# Patient Record
Sex: MALE | Race: WHITE | NOT HISPANIC OR LATINO | Employment: OTHER | URBAN - METROPOLITAN AREA
[De-identification: names, ages, dates, MRNs, and addresses within clinical notes are randomized per-mention and may not be internally consistent; named-entity substitution may affect disease eponyms.]

---

## 2018-08-11 ENCOUNTER — APPOINTMENT (OUTPATIENT)
Dept: RADIOLOGY | Facility: MEDICAL CENTER | Age: 68
End: 2018-08-11
Attending: EMERGENCY MEDICINE
Payer: COMMERCIAL

## 2018-08-11 ENCOUNTER — HOSPITAL ENCOUNTER (OUTPATIENT)
Facility: MEDICAL CENTER | Age: 68
End: 2018-08-13
Attending: EMERGENCY MEDICINE | Admitting: INTERNAL MEDICINE
Payer: COMMERCIAL

## 2018-08-11 DIAGNOSIS — R55 SYNCOPE, UNSPECIFIED SYNCOPE TYPE: ICD-10-CM

## 2018-08-11 DIAGNOSIS — R07.9 CHEST PAIN, UNSPECIFIED TYPE: ICD-10-CM

## 2018-08-11 PROBLEM — I48.0 PAROXYSMAL A-FIB (HCC): Status: ACTIVE | Noted: 2018-08-11

## 2018-08-11 PROBLEM — N17.9 ACUTE KIDNEY INJURY (HCC): Status: ACTIVE | Noted: 2018-08-11

## 2018-08-11 LAB
ALBUMIN SERPL BCP-MCNC: 3.8 G/DL (ref 3.2–4.9)
ALBUMIN/GLOB SERPL: 1.4 G/DL
ALP SERPL-CCNC: 89 U/L (ref 30–99)
ALT SERPL-CCNC: 11 U/L (ref 2–50)
ANION GAP SERPL CALC-SCNC: 12 MMOL/L (ref 0–11.9)
APTT PPP: 32 SEC (ref 24.7–36)
AST SERPL-CCNC: 12 U/L (ref 12–45)
BASOPHILS # BLD AUTO: 1.1 % (ref 0–1.8)
BASOPHILS # BLD: 0.08 K/UL (ref 0–0.12)
BILIRUB SERPL-MCNC: 0.4 MG/DL (ref 0.1–1.5)
BNP SERPL-MCNC: 65 PG/ML (ref 0–100)
BUN SERPL-MCNC: 24 MG/DL (ref 8–22)
CALCIUM SERPL-MCNC: 8.9 MG/DL (ref 8.5–10.5)
CHLORIDE SERPL-SCNC: 106 MMOL/L (ref 96–112)
CO2 SERPL-SCNC: 18 MMOL/L (ref 20–33)
CREAT SERPL-MCNC: 1.39 MG/DL (ref 0.5–1.4)
DEPRECATED D DIMER PPP IA-ACNC: 408 NG/ML(D-DU)
EKG IMPRESSION: NORMAL
EKG IMPRESSION: NORMAL
EOSINOPHIL # BLD AUTO: 0.17 K/UL (ref 0–0.51)
EOSINOPHIL NFR BLD: 2.4 % (ref 0–6.9)
ERYTHROCYTE [DISTWIDTH] IN BLOOD BY AUTOMATED COUNT: 44.1 FL (ref 35.9–50)
ETHANOL BLD-MCNC: 0.08 G/DL
GLOBULIN SER CALC-MCNC: 2.7 G/DL (ref 1.9–3.5)
GLUCOSE BLD-MCNC: 127 MG/DL (ref 65–99)
GLUCOSE SERPL-MCNC: 170 MG/DL (ref 65–99)
HCT VFR BLD AUTO: 36.7 % (ref 42–52)
HGB BLD-MCNC: 12.4 G/DL (ref 14–18)
IMM GRANULOCYTES # BLD AUTO: 0.06 K/UL (ref 0–0.11)
IMM GRANULOCYTES NFR BLD AUTO: 0.8 % (ref 0–0.9)
INR PPP: 1.24 (ref 0.87–1.13)
LYMPHOCYTES # BLD AUTO: 1.52 K/UL (ref 1–4.8)
LYMPHOCYTES NFR BLD: 21.1 % (ref 22–41)
MCH RBC QN AUTO: 28 PG (ref 27–33)
MCHC RBC AUTO-ENTMCNC: 33.8 G/DL (ref 33.7–35.3)
MCV RBC AUTO: 82.8 FL (ref 81.4–97.8)
MONOCYTES # BLD AUTO: 0.66 K/UL (ref 0–0.85)
MONOCYTES NFR BLD AUTO: 9.2 % (ref 0–13.4)
NEUTROPHILS # BLD AUTO: 4.71 K/UL (ref 1.82–7.42)
NEUTROPHILS NFR BLD: 65.4 % (ref 44–72)
NRBC # BLD AUTO: 0 K/UL
NRBC BLD-RTO: 0 /100 WBC
PLATELET # BLD AUTO: 208 K/UL (ref 164–446)
PMV BLD AUTO: 9.8 FL (ref 9–12.9)
POTASSIUM SERPL-SCNC: 4 MMOL/L (ref 3.6–5.5)
PROT SERPL-MCNC: 6.5 G/DL (ref 6–8.2)
PROTHROMBIN TIME: 15.3 SEC (ref 12–14.6)
RBC # BLD AUTO: 4.43 M/UL (ref 4.7–6.1)
SODIUM SERPL-SCNC: 136 MMOL/L (ref 135–145)
TROPONIN I SERPL-MCNC: <0.01 NG/ML (ref 0–0.04)
WBC # BLD AUTO: 7.2 K/UL (ref 4.8–10.8)

## 2018-08-11 PROCEDURE — 84484 ASSAY OF TROPONIN QUANT: CPT | Mod: 91

## 2018-08-11 PROCEDURE — 700105 HCHG RX REV CODE 258: Performed by: INTERNAL MEDICINE

## 2018-08-11 PROCEDURE — 85379 FIBRIN DEGRADATION QUANT: CPT

## 2018-08-11 PROCEDURE — 304561 HCHG STAT O2

## 2018-08-11 PROCEDURE — 93005 ELECTROCARDIOGRAM TRACING: CPT | Performed by: EMERGENCY MEDICINE

## 2018-08-11 PROCEDURE — 36415 COLL VENOUS BLD VENIPUNCTURE: CPT

## 2018-08-11 PROCEDURE — G0378 HOSPITAL OBSERVATION PER HR: HCPCS

## 2018-08-11 PROCEDURE — 85610 PROTHROMBIN TIME: CPT

## 2018-08-11 PROCEDURE — 70450 CT HEAD/BRAIN W/O DYE: CPT

## 2018-08-11 PROCEDURE — 93010 ELECTROCARDIOGRAM REPORT: CPT | Performed by: INTERNAL MEDICINE

## 2018-08-11 PROCEDURE — 93005 ELECTROCARDIOGRAM TRACING: CPT | Performed by: INTERNAL MEDICINE

## 2018-08-11 PROCEDURE — 85730 THROMBOPLASTIN TIME PARTIAL: CPT

## 2018-08-11 PROCEDURE — A9270 NON-COVERED ITEM OR SERVICE: HCPCS | Performed by: INTERNAL MEDICINE

## 2018-08-11 PROCEDURE — 99220 PR INITIAL OBSERVATION CARE,LEVL III: CPT | Performed by: INTERNAL MEDICINE

## 2018-08-11 PROCEDURE — 83880 ASSAY OF NATRIURETIC PEPTIDE: CPT

## 2018-08-11 PROCEDURE — 93005 ELECTROCARDIOGRAM TRACING: CPT

## 2018-08-11 PROCEDURE — 85025 COMPLETE CBC W/AUTO DIFF WBC: CPT

## 2018-08-11 PROCEDURE — 700105 HCHG RX REV CODE 258: Performed by: EMERGENCY MEDICINE

## 2018-08-11 PROCEDURE — 80053 COMPREHEN METABOLIC PANEL: CPT

## 2018-08-11 PROCEDURE — 80307 DRUG TEST PRSMV CHEM ANLYZR: CPT

## 2018-08-11 PROCEDURE — 82962 GLUCOSE BLOOD TEST: CPT

## 2018-08-11 PROCEDURE — 96372 THER/PROPH/DIAG INJ SC/IM: CPT

## 2018-08-11 PROCEDURE — 99285 EMERGENCY DEPT VISIT HI MDM: CPT

## 2018-08-11 PROCEDURE — 700102 HCHG RX REV CODE 250 W/ 637 OVERRIDE(OP): Performed by: INTERNAL MEDICINE

## 2018-08-11 PROCEDURE — 71045 X-RAY EXAM CHEST 1 VIEW: CPT

## 2018-08-11 RX ORDER — SODIUM CHLORIDE 9 MG/ML
1000 INJECTION, SOLUTION INTRAVENOUS ONCE
Status: COMPLETED | OUTPATIENT
Start: 2018-08-11 | End: 2018-08-11

## 2018-08-11 RX ORDER — SODIUM CHLORIDE 9 MG/ML
INJECTION, SOLUTION INTRAVENOUS CONTINUOUS
Status: DISCONTINUED | OUTPATIENT
Start: 2018-08-11 | End: 2018-08-13

## 2018-08-11 RX ORDER — LABETALOL HYDROCHLORIDE 5 MG/ML
10 INJECTION, SOLUTION INTRAVENOUS
Status: DISCONTINUED | OUTPATIENT
Start: 2018-08-11 | End: 2018-08-11

## 2018-08-11 RX ORDER — HYDRALAZINE HYDROCHLORIDE 25 MG/1
25 TABLET, FILM COATED ORAL 3 TIMES DAILY
Status: DISCONTINUED | OUTPATIENT
Start: 2018-08-11 | End: 2018-08-13

## 2018-08-11 RX ORDER — CLONAZEPAM 0.5 MG/1
0.25 TABLET ORAL
COMMUNITY

## 2018-08-11 RX ORDER — AMOXICILLIN 250 MG
2 CAPSULE ORAL 2 TIMES DAILY
Status: DISCONTINUED | OUTPATIENT
Start: 2018-08-11 | End: 2018-08-13 | Stop reason: HOSPADM

## 2018-08-11 RX ORDER — INSULIN GLARGINE 100 [IU]/ML
24 INJECTION, SOLUTION SUBCUTANEOUS NIGHTLY
Status: DISCONTINUED | OUTPATIENT
Start: 2018-08-11 | End: 2018-08-13 | Stop reason: HOSPADM

## 2018-08-11 RX ORDER — ATORVASTATIN CALCIUM 10 MG/1
10 TABLET, FILM COATED ORAL
Status: DISCONTINUED | OUTPATIENT
Start: 2018-08-11 | End: 2018-08-13 | Stop reason: HOSPADM

## 2018-08-11 RX ORDER — BISACODYL 10 MG
10 SUPPOSITORY, RECTAL RECTAL
Status: DISCONTINUED | OUTPATIENT
Start: 2018-08-11 | End: 2018-08-13 | Stop reason: HOSPADM

## 2018-08-11 RX ORDER — POLYETHYLENE GLYCOL 3350 17 G/17G
1 POWDER, FOR SOLUTION ORAL
Status: DISCONTINUED | OUTPATIENT
Start: 2018-08-11 | End: 2018-08-13 | Stop reason: HOSPADM

## 2018-08-11 RX ORDER — DEXTROSE MONOHYDRATE 25 G/50ML
25 INJECTION, SOLUTION INTRAVENOUS
Status: DISCONTINUED | OUTPATIENT
Start: 2018-08-11 | End: 2018-08-13 | Stop reason: HOSPADM

## 2018-08-11 RX ORDER — ONDANSETRON 4 MG/1
4 TABLET, ORALLY DISINTEGRATING ORAL EVERY 4 HOURS PRN
Status: DISCONTINUED | OUTPATIENT
Start: 2018-08-11 | End: 2018-08-13 | Stop reason: HOSPADM

## 2018-08-11 RX ORDER — HYDRALAZINE HYDROCHLORIDE 25 MG/1
25 TABLET, FILM COATED ORAL 3 TIMES DAILY
Status: ON HOLD | COMMUNITY
End: 2018-08-13

## 2018-08-11 RX ORDER — ASPIRIN 300 MG/1
300 SUPPOSITORY RECTAL DAILY
Status: DISCONTINUED | OUTPATIENT
Start: 2018-08-11 | End: 2018-08-11

## 2018-08-11 RX ORDER — ONDANSETRON 2 MG/ML
4 INJECTION INTRAMUSCULAR; INTRAVENOUS EVERY 4 HOURS PRN
Status: DISCONTINUED | OUTPATIENT
Start: 2018-08-11 | End: 2018-08-13 | Stop reason: HOSPADM

## 2018-08-11 RX ORDER — CLONAZEPAM 0.5 MG/1
0.25 TABLET ORAL
Status: DISCONTINUED | OUTPATIENT
Start: 2018-08-11 | End: 2018-08-13 | Stop reason: HOSPADM

## 2018-08-11 RX ORDER — HEPARIN SODIUM 5000 [USP'U]/ML
5000 INJECTION, SOLUTION INTRAVENOUS; SUBCUTANEOUS EVERY 8 HOURS
Status: DISCONTINUED | OUTPATIENT
Start: 2018-08-11 | End: 2018-08-11

## 2018-08-11 RX ORDER — ASPIRIN 325 MG
325 TABLET ORAL DAILY
Status: DISCONTINUED | OUTPATIENT
Start: 2018-08-11 | End: 2018-08-11

## 2018-08-11 RX ORDER — FUROSEMIDE 40 MG/1
40 TABLET ORAL DAILY
Status: ON HOLD | COMMUNITY
End: 2018-08-13

## 2018-08-11 RX ORDER — CARVEDILOL 3.12 MG/1
3.12 TABLET ORAL 2 TIMES DAILY WITH MEALS
Status: DISCONTINUED | OUTPATIENT
Start: 2018-08-11 | End: 2018-08-12

## 2018-08-11 RX ORDER — INSULIN GLARGINE 100 [IU]/ML
24 INJECTION, SOLUTION SUBCUTANEOUS NIGHTLY
COMMUNITY

## 2018-08-11 RX ORDER — ASPIRIN 81 MG/1
81 TABLET, CHEWABLE ORAL DAILY
Status: DISCONTINUED | OUTPATIENT
Start: 2018-08-12 | End: 2018-08-13 | Stop reason: HOSPADM

## 2018-08-11 RX ORDER — ACETAMINOPHEN 325 MG/1
650 TABLET ORAL EVERY 6 HOURS PRN
Status: DISCONTINUED | OUTPATIENT
Start: 2018-08-11 | End: 2018-08-13 | Stop reason: HOSPADM

## 2018-08-11 RX ORDER — ASPIRIN 81 MG/1
324 TABLET, CHEWABLE ORAL DAILY
Status: DISCONTINUED | OUTPATIENT
Start: 2018-08-11 | End: 2018-08-11

## 2018-08-11 RX ORDER — ASPIRIN 325 MG
325 TABLET ORAL ONCE
Status: COMPLETED | OUTPATIENT
Start: 2018-08-11 | End: 2018-08-11

## 2018-08-11 RX ADMIN — APIXABAN 5 MG: 5 TABLET, FILM COATED ORAL at 20:42

## 2018-08-11 RX ADMIN — INSULIN GLARGINE 24 UNITS: 100 INJECTION, SOLUTION SUBCUTANEOUS at 22:12

## 2018-08-11 RX ADMIN — SODIUM CHLORIDE: 9 INJECTION, SOLUTION INTRAVENOUS at 22:11

## 2018-08-11 RX ADMIN — ASPIRIN 325 MG: 325 TABLET, COATED ORAL at 22:11

## 2018-08-11 RX ADMIN — SODIUM CHLORIDE 1000 ML: 9 INJECTION, SOLUTION INTRAVENOUS at 13:43

## 2018-08-11 RX ADMIN — CARVEDILOL 3.12 MG: 3.12 TABLET, FILM COATED ORAL at 20:42

## 2018-08-11 RX ADMIN — CLONAZEPAM 0.25 MG: 0.5 TABLET ORAL at 20:42

## 2018-08-11 RX ADMIN — HYDRALAZINE HYDROCHLORIDE 25 MG: 25 TABLET, FILM COATED ORAL at 20:42

## 2018-08-11 RX ADMIN — ATORVASTATIN CALCIUM 10 MG: 10 TABLET, FILM COATED ORAL at 20:42

## 2018-08-11 ASSESSMENT — COGNITIVE AND FUNCTIONAL STATUS - GENERAL
SUGGESTED CMS G CODE MODIFIER MOBILITY: CI
DAILY ACTIVITIY SCORE: 24
CLIMB 3 TO 5 STEPS WITH RAILING: A LITTLE
SUGGESTED CMS G CODE MODIFIER DAILY ACTIVITY: CH
MOBILITY SCORE: 23

## 2018-08-11 ASSESSMENT — ENCOUNTER SYMPTOMS
HEADACHES: 0
WEIGHT LOSS: 0
COUGH: 0
LOSS OF CONSCIOUSNESS: 1
INSOMNIA: 0
FOCAL WEAKNESS: 0
DIZZINESS: 0
BLURRED VISION: 0
SPUTUM PRODUCTION: 0
EYE PAIN: 0
DIARRHEA: 0
MYALGIAS: 0
NERVOUS/ANXIOUS: 0
STRIDOR: 0
FEVER: 0
NAUSEA: 0
BACK PAIN: 0
EYE DISCHARGE: 0
CHILLS: 0
ORTHOPNEA: 0
EYE REDNESS: 0
PALPITATIONS: 0
NECK PAIN: 0
DEPRESSION: 0
VOMITING: 0
HEARTBURN: 0
SEIZURES: 0
ABDOMINAL PAIN: 0
SHORTNESS OF BREATH: 0

## 2018-08-11 ASSESSMENT — PAIN SCALES - GENERAL
PAINLEVEL_OUTOF10: 2
PAINLEVEL_OUTOF10: 0

## 2018-08-11 ASSESSMENT — PATIENT HEALTH QUESTIONNAIRE - PHQ9
1. LITTLE INTEREST OR PLEASURE IN DOING THINGS: NOT AT ALL
2. FEELING DOWN, DEPRESSED, IRRITABLE, OR HOPELESS: NOT AT ALL
SUM OF ALL RESPONSES TO PHQ9 QUESTIONS 1 AND 2: 0

## 2018-08-11 ASSESSMENT — LIFESTYLE VARIABLES
ALCOHOL_USE: NO
EVER_SMOKED: NEVER

## 2018-08-11 NOTE — ED PROVIDER NOTES
ED Provider Note    CHIEF COMPLAINT  Chief Complaint   Patient presents with   • Syncope     Pt had witnessed syncopal event while at [facility].  Pt had 2 gins this morning.  Pt has history CABG in Oct 2013 and recent MI in June.  No trauma noted.         HPI  Christo Degroot is a 68 y.o. male with a history of coronary artery disease, status post CABG ×1 in 2000, CABG ×3 in 2013, status post stent placement in June 2018, status post pacemaker placement in 2004, with Medtronic pacemaker replacement in February 2018, diabetes mellitus, congestive heart failure, hypertension, CVA, noncompliance with medications who presents after having a witnessed syncopal episode.  Patient was at a local [facility], when he apparently passed out.  Patient is visiting from [place], said he drove himself out and arrived a week ago on Saturday. He says he developed chest pain radiating to his left shoulder on Sunday and was taken to the [facility] on Monday.  He was found to have uncontrolled high blood pressure, along with dizziness and complaints of chest pain.  The patient was told he would need to be transferred to [facility] to see cardiologist, and refused transfer, and signed out AGAINST MEDICAL ADVICE.  The patient has multiple complaints.  He says been having a headache, lightheadedness, dizziness.  He has been feeling nauseous but has had no vomiting.  He has been having chest pain almost on a daily basis radiating to his left neck and shoulder.  He has been feeling short of breath since arriving to [place].  He has had a cough which has been productive of whitish sputum.  He denies any fever, shaking chills, sore throat, abdominal pain, or diarrhea.  The patient has been noncompliant with his medications.  He is supposed to be on Eliquis but has not taken it for some time.    REVIEW OF SYSTEMS  See HPI for further details. All other systems are negative.     PAST MEDICAL HISTORY  Past Medical History:   Diagnosis Date   •  "CAD (coronary artery disease)    • Chronic obstructive pulmonary disease (HCC)    • Congestive heart failure (HCC)    • Diabetes (HCC)    • History of heart artery stent    • Hx of CABG    • Hypertension    • Pacemaker    • Stroke (HCC)        FAMILY HISTORY  History reviewed. No pertinent family history.    SOCIAL HISTORY  Social History     Social History   • Marital status: N/A     Spouse name: N/A   • Number of children: N/A   • Years of education: N/A     Social History Main Topics   • Smoking status: Never Smoker   • Smokeless tobacco: Never Used   • Alcohol use No      Comment: 1-2 drinks per month   • Drug use: No   • Sexual activity: Not on file     Other Topics Concern   • Not on file     Social History Narrative   • No narrative on file       SURGICAL HISTORY  History reviewed. No pertinent surgical history.    CURRENT MEDICATIONS  Home Medications    **Home medications have not yet been reviewed for this encounter**         ALLERGIES  Allergies   Allergen Reactions   • Lisinopril        EKG: Twelve-lead EKG shows atrial fibrillation, with ventricular paced complexes, rate 72, normal intervals, normal axis, no acute ST wave elevations, mild ST depressions in the anterior leads, inverted T waves in leads V2 through V6, no pathologic Q waves, no evidence of an acute injury pattern, the ST depressions and inverted T waves indicate ischemia, there is no previous EKG for comparison.    PHYSICAL EXAM  VITAL SIGNS: /66   Pulse 73   Temp 36.5 °C (97.7 °F)   Resp 16   Ht 1.854 m (6' 1\")   Wt 98.9 kg (218 lb)   BMI 28.76 kg/m²   Constitutional: Awake, somewhat sedated appearing, in no acute distress, Non-toxic appearance.   HENT: Atraumatic. Bilateral external ears normal, mucous membranes mildly dry, throat nonerythematous without exudates, nose is normal.  Eyes: PERRL, EOMI, conjunctiva moist, noninjected.  Neck: Nontender, Normal range of motion, No nuchal rigidity, No stridor.   Lymphatic: No " lymphadenopathy noted.   Cardiovascular: Regular rate and rhythm, no murmurs, rubs, gallops.  Thorax & Lungs:  Good breath sounds bilaterally, no wheezes, rales, or retractions.  No chest tenderness.  Abdomen: Bowel sounds normal, Soft, nontender, nondistended, no rebound, guarding, masses.  Back: No CVA or spinal tenderness.  Extremities: Intact distal pulses, No edema, No tenderness.   Skin: Warm, Dry, No rashes.   Musculoskeletal: No joint swelling or tenderness.  Neurologic: Awake, sedated appearing, oriented ×3, speech is mildly slurred, cranial nerves II through XII shows a mild right facial droop, mild dysarthria, sensory intact to light touch, and motor function shows 5/5 strength to the upper and lower extremities, no focal deficits.   Psychiatric: Affect normal, Judgment normal, Mood normal.     EKG  Twelve-lead EKG shows what appears to be atrial fibrillation with occasional pacer spikes, rate of 72, normal intervals, normal axis, no acute ST wave elevations, ST depressions in leads V2 through V5, no pathologic Q waves, inverted T waves in leads II, 3, aVF, V2 through V5, no evidence of an acute injury pattern, the ST depressions may indicate ischemia, there is no previous EKG for comparison.        RADIOLOGY/PROCEDURES  CT-HEAD W/O   Final Result         NO ACUTE ABNORMALITIES ARE NOTED ON CT SCAN OF THE HEAD.      Findings are consistent with atrophy.  Decreased attenuation in the periventricular white matter likely indicates microvascular ischemic disease.      DX-CHEST-LIMITED (1 VIEW)   Final Result         No acute cardiac or pulmonary abnormality is identified. Cardiomegaly is noted.      Echocardiogram Comp W/O Cont    (Results Pending)   Carotid Duplex ([facility] Only)    (Results Pending)   NM-CARDIAC STRESS TEST    (Results Pending)         COURSE & MEDICAL DECISION MAKING  Pertinent Labs & Imaging studies reviewed. (See chart for details)  The patient presents after having a  syncopal episode.  He complains of having chest pain which appears to be chronic. He had a syncopal episode, remains lightheaded and dizzy with borderline hypotension.  He was started on IV fluids. EKG shows atrial fibrillation with paced complexes.  Chest x-ray shows no acute cardiopulmonary abnormalities.  CT scan of the head without contrast shows no acute intracranial findings.  CBC shows white count 7200, hemoglobin 12.4, 65% polys, chemistry shows a CO2 of 18, anion gap 12, glucose 170, BUN 24, creatinine 1.39, liver function tests normal, troponin less than 0.01, BNP 65, INR 1.24, diagnosed alcohol 0.08.  On recheck he denies any dizziness, continues with chest pain.  He did become hypotensive which improved with IV fluids.  Findings were discussed with the patient.  Given his symptoms he will will be admitted.  I did obtain old records from the [facility].  The patient is noncompliant with medications.  He does have a history of atrial fibrillation and should be on eliquis, but has not taken it for several months.  He has a Medtronic pacemaker which was replaced in February of this year, and was last checked in April per the patient.  He is also noncompliant with his blood pressure medications.  Given his symptoms he will be admitted.  Case was discussed with Dr. Valdivia.  At his request, Dr. Muhammad of cardiology was consulted.    FINAL IMPRESSION  1.  Syncopal episode  2.  Chest pain  3.         Electronically signed by: Nuno Baltazar, 8/11/2018 1:28 PM

## 2018-08-12 ENCOUNTER — APPOINTMENT (OUTPATIENT)
Dept: RADIOLOGY | Facility: MEDICAL CENTER | Age: 68
End: 2018-08-12
Attending: NURSE PRACTITIONER
Payer: COMMERCIAL

## 2018-08-12 PROBLEM — R55 SYNCOPE: Status: ACTIVE | Noted: 2018-08-12

## 2018-08-12 PROBLEM — I95.1 SYNCOPE DUE TO ORTHOSTATIC HYPOTENSION: Status: ACTIVE | Noted: 2018-08-12

## 2018-08-12 LAB
ALBUMIN SERPL BCP-MCNC: 3.7 G/DL (ref 3.2–4.9)
ALBUMIN/GLOB SERPL: 1.4 G/DL
ALP SERPL-CCNC: 89 U/L (ref 30–99)
ALT SERPL-CCNC: 8 U/L (ref 2–50)
ANION GAP SERPL CALC-SCNC: 6 MMOL/L (ref 0–11.9)
AST SERPL-CCNC: 11 U/L (ref 12–45)
BILIRUB SERPL-MCNC: 0.5 MG/DL (ref 0.1–1.5)
BUN SERPL-MCNC: 19 MG/DL (ref 8–22)
CALCIUM SERPL-MCNC: 8.8 MG/DL (ref 8.5–10.5)
CHLORIDE SERPL-SCNC: 108 MMOL/L (ref 96–112)
CO2 SERPL-SCNC: 22 MMOL/L (ref 20–33)
CREAT SERPL-MCNC: 1.25 MG/DL (ref 0.5–1.4)
EKG IMPRESSION: NORMAL
ERYTHROCYTE [DISTWIDTH] IN BLOOD BY AUTOMATED COUNT: 41.7 FL (ref 35.9–50)
GLOBULIN SER CALC-MCNC: 2.6 G/DL (ref 1.9–3.5)
GLUCOSE BLD-MCNC: 119 MG/DL (ref 65–99)
GLUCOSE BLD-MCNC: 123 MG/DL (ref 65–99)
GLUCOSE BLD-MCNC: 181 MG/DL (ref 65–99)
GLUCOSE BLD-MCNC: 186 MG/DL (ref 65–99)
GLUCOSE SERPL-MCNC: 136 MG/DL (ref 65–99)
HCT VFR BLD AUTO: 36 % (ref 42–52)
HGB BLD-MCNC: 12.3 G/DL (ref 14–18)
LV EJECT FRACT  99904: 55
LV EJECT FRACT MOD 2C 99903: 46.75
LV EJECT FRACT MOD 4C 99902: 34.7
LV EJECT FRACT MOD BP 99901: 38.6
MCH RBC QN AUTO: 28 PG (ref 27–33)
MCHC RBC AUTO-ENTMCNC: 34.2 G/DL (ref 33.7–35.3)
MCV RBC AUTO: 81.8 FL (ref 81.4–97.8)
PLATELET # BLD AUTO: 183 K/UL (ref 164–446)
PMV BLD AUTO: 9.7 FL (ref 9–12.9)
POTASSIUM SERPL-SCNC: 4.3 MMOL/L (ref 3.6–5.5)
PROT SERPL-MCNC: 6.3 G/DL (ref 6–8.2)
RBC # BLD AUTO: 4.4 M/UL (ref 4.7–6.1)
SODIUM SERPL-SCNC: 136 MMOL/L (ref 135–145)
WBC # BLD AUTO: 7.8 K/UL (ref 4.8–10.8)

## 2018-08-12 PROCEDURE — A9270 NON-COVERED ITEM OR SERVICE: HCPCS | Performed by: INTERNAL MEDICINE

## 2018-08-12 PROCEDURE — 36415 COLL VENOUS BLD VENIPUNCTURE: CPT

## 2018-08-12 PROCEDURE — 96374 THER/PROPH/DIAG INJ IV PUSH: CPT | Mod: XU

## 2018-08-12 PROCEDURE — 93306 TTE W/DOPPLER COMPLETE: CPT | Mod: 26 | Performed by: INTERNAL MEDICINE

## 2018-08-12 PROCEDURE — 700105 HCHG RX REV CODE 258: Performed by: INTERNAL MEDICINE

## 2018-08-12 PROCEDURE — A9502 TC99M TETROFOSMIN: HCPCS

## 2018-08-12 PROCEDURE — 93880 EXTRACRANIAL BILAT STUDY: CPT

## 2018-08-12 PROCEDURE — 85027 COMPLETE CBC AUTOMATED: CPT

## 2018-08-12 PROCEDURE — 99226 PR SUBSEQUENT OBSERVATION CARE,LEVEL III: CPT | Performed by: INTERNAL MEDICINE

## 2018-08-12 PROCEDURE — 93306 TTE W/DOPPLER COMPLETE: CPT

## 2018-08-12 PROCEDURE — 82962 GLUCOSE BLOOD TEST: CPT | Mod: 91

## 2018-08-12 PROCEDURE — 700111 HCHG RX REV CODE 636 W/ 250 OVERRIDE (IP)

## 2018-08-12 PROCEDURE — 700111 HCHG RX REV CODE 636 W/ 250 OVERRIDE (IP): Performed by: INTERNAL MEDICINE

## 2018-08-12 PROCEDURE — 93880 EXTRACRANIAL BILAT STUDY: CPT | Mod: 26 | Performed by: SURGERY

## 2018-08-12 PROCEDURE — 96372 THER/PROPH/DIAG INJ SC/IM: CPT | Mod: XU

## 2018-08-12 PROCEDURE — 700102 HCHG RX REV CODE 250 W/ 637 OVERRIDE(OP): Performed by: INTERNAL MEDICINE

## 2018-08-12 PROCEDURE — G0378 HOSPITAL OBSERVATION PER HR: HCPCS

## 2018-08-12 PROCEDURE — 80053 COMPREHEN METABOLIC PANEL: CPT

## 2018-08-12 RX ORDER — REGADENOSON 0.08 MG/ML
INJECTION, SOLUTION INTRAVENOUS
Status: COMPLETED
Start: 2018-08-12 | End: 2018-08-12

## 2018-08-12 RX ADMIN — APIXABAN 5 MG: 5 TABLET, FILM COATED ORAL at 17:40

## 2018-08-12 RX ADMIN — REGADENOSON 0.4 MG: 0.08 INJECTION, SOLUTION INTRAVENOUS at 11:42

## 2018-08-12 RX ADMIN — APIXABAN 5 MG: 5 TABLET, FILM COATED ORAL at 06:10

## 2018-08-12 RX ADMIN — HYDRALAZINE HYDROCHLORIDE 25 MG: 25 TABLET, FILM COATED ORAL at 06:10

## 2018-08-12 RX ADMIN — CLONAZEPAM 0.25 MG: 0.5 TABLET ORAL at 20:38

## 2018-08-12 RX ADMIN — INSULIN GLARGINE 24 UNITS: 100 INJECTION, SOLUTION SUBCUTANEOUS at 18:25

## 2018-08-12 RX ADMIN — CARVEDILOL 3.12 MG: 3.12 TABLET, FILM COATED ORAL at 06:10

## 2018-08-12 RX ADMIN — ASPIRIN 81 MG: 81 TABLET, CHEWABLE ORAL at 06:10

## 2018-08-12 RX ADMIN — ONDANSETRON 4 MG: 4 TABLET, ORALLY DISINTEGRATING ORAL at 20:27

## 2018-08-12 RX ADMIN — SODIUM CHLORIDE: 9 INJECTION, SOLUTION INTRAVENOUS at 06:14

## 2018-08-12 RX ADMIN — ONDANSETRON 4 MG: 2 INJECTION INTRAMUSCULAR; INTRAVENOUS at 03:34

## 2018-08-12 RX ADMIN — HYDRALAZINE HYDROCHLORIDE 25 MG: 25 TABLET, FILM COATED ORAL at 20:27

## 2018-08-12 RX ADMIN — HYDRALAZINE HYDROCHLORIDE 25 MG: 25 TABLET, FILM COATED ORAL at 13:18

## 2018-08-12 RX ADMIN — INSULIN HUMAN 1 UNITS: 100 INJECTION, SOLUTION PARENTERAL at 17:40

## 2018-08-12 RX ADMIN — INSULIN HUMAN 1 UNITS: 100 INJECTION, SOLUTION PARENTERAL at 13:16

## 2018-08-12 RX ADMIN — ATORVASTATIN CALCIUM 10 MG: 10 TABLET, FILM COATED ORAL at 17:40

## 2018-08-12 ASSESSMENT — ENCOUNTER SYMPTOMS
EYE PAIN: 0
HEARTBURN: 0
CONSTIPATION: 0
LOSS OF CONSCIOUSNESS: 1
CLAUDICATION: 0
SPEECH CHANGE: 0
DIZZINESS: 1
DIARRHEA: 0
BACK PAIN: 0
WHEEZING: 0
FALLS: 0
SPUTUM PRODUCTION: 0
ABDOMINAL PAIN: 0
BLURRED VISION: 0
PALPITATIONS: 0
NECK PAIN: 0
FEVER: 0
COUGH: 0
CHILLS: 0
SHORTNESS OF BREATH: 0
WEAKNESS: 0
SEIZURES: 0
WEIGHT LOSS: 0
NAUSEA: 0
PND: 0
ORTHOPNEA: 0
VOMITING: 0
DEPRESSION: 0
HEADACHES: 0
TREMORS: 0

## 2018-08-12 ASSESSMENT — PAIN SCALES - GENERAL
PAINLEVEL_OUTOF10: 0

## 2018-08-12 ASSESSMENT — LIFESTYLE VARIABLES: SUBSTANCE_ABUSE: 0

## 2018-08-12 NOTE — ASSESSMENT & PLAN NOTE
Type 2 diabetes, long-term use of insulin, without complication, uncontrolled with hyperglycemia   continue insulin sliding scale to cover.

## 2018-08-12 NOTE — H&P
Hospital Medicine History and Physical      Date of Service  8/11/2018    Chief Complaint  Chief Complaint   Patient presents with   • Syncope     Pt had witnessed syncopal event while at [employer].  Pt had 2 gins this morning.  Pt has history CABG in Oct 2013 and recent MI in June.  No trauma noted.         History of Presenting Illness  Zane is a 68 y.o. male PMH of significant coronary artery disease post CABG x2, 8 stents with last stent placed in June 2018 in [facility], post pacemaker placement who presents with chest pain.  He is visiting his daughter here and planning to move here.  [facility].  According to him he has been having intermittent chest pain even after he had a stent placed in June 2018.  He did not get a chance to follow-up with any cardiology after that event.  He described the pain as pressure-like pain over his left-sided chest area, 8 out of 10 intensity, radiated to his left arm.  He stated that this pain is similar to the time where he has heart attack in the past.  Due to his complicated coronary artery disease and ongoing chest pain cardiology was consulted in ER.  He also had syncope episode earlier today after he had a few shots of cocktail at [employer].  He denied biting his tongue, urinary or bowel incontinence.  He has history of similar syncope episode in the past.    Primary Care Physician  No primary care provider on file.      Code Status  Full code    Review of Systems  Review of Systems   Constitutional: Negative for chills, fever and weight loss.   HENT: Negative for congestion and nosebleeds.    Eyes: Negative for blurred vision, pain, discharge and redness.   Respiratory: Negative for cough, sputum production, shortness of breath and stridor.    Cardiovascular: Positive for chest pain. Negative for palpitations and orthopnea.   Gastrointestinal: Negative for abdominal pain, diarrhea, heartburn, nausea and vomiting.   Genitourinary: Negative for dysuria,  frequency and urgency.   Musculoskeletal: Negative for back pain, myalgias and neck pain.   Skin: Negative for itching and rash.   Neurological: Positive for loss of consciousness. Negative for dizziness, focal weakness, seizures and headaches.   Psychiatric/Behavioral: Negative for depression. The patient is not nervous/anxious and does not have insomnia.      Please see HPI, all other systems were reviewed and are negative (AMA/CMS criteria)     Past Medical History  Past Medical History:   Diagnosis Date   • CAD (coronary artery disease)    • Chronic obstructive pulmonary disease (HCC)    • Congestive heart failure (HCC)    • Diabetes (HCC)    • History of heart artery stent    • Hx of CABG    • Hypertension    • Pacemaker    • Stroke (HCC)        Surgical History  No past surgical history on file.    Medications  No current facility-administered medications on file prior to encounter.      No current outpatient prescriptions on file prior to encounter.     Family History  History reviewed. No pertinent family history.      Social History  Social History   Substance Use Topics   • Smoking status: Never Smoker   • Smokeless tobacco: Never Used   • Alcohol use No      Comment: 1-2 drinks per month       Allergies  Allergies   Allergen Reactions   • Lisinopril         Physical Exam  Laboratory   Hemodynamics  Temp (24hrs), Av.5 °C (97.7 °F), Min:36.5 °C (97.7 °F), Max:36.5 °C (97.7 °F)   Temperature: 36.5 °C (97.7 °F)  Pulse  Av.4  Min: 73  Max: 82 Heart Rate (Monitored): 85  Blood Pressure : 147/92, NIBP: (!) 163/106      Respiratory      Respiration: (!) 21, Pulse Oximetry: 100 %             Physical Exam   Constitutional: He is oriented to person, place, and time. No distress.   HENT:   Head: Normocephalic and atraumatic.   Mouth/Throat: Oropharynx is clear and moist.   Eyes: Pupils are equal, round, and reactive to light. Conjunctivae and EOM are normal.   Neck: Normal range of motion. Neck supple. No  tracheal deviation present. No thyromegaly present.   Cardiovascular: Normal rate and regular rhythm.    No murmur heard.  Pulmonary/Chest: Effort normal and breath sounds normal. No respiratory distress. He has no wheezes.   Abdominal: Soft. Bowel sounds are normal. He exhibits no distension. There is no tenderness.   Musculoskeletal: He exhibits no edema or tenderness.   Neurological: He is alert and oriented to person, place, and time. No cranial nerve deficit.   Skin: Skin is warm and dry. He is not diaphoretic. No erythema.   Psychiatric: He has a normal mood and affect. His behavior is normal. Thought content normal.       Recent Labs      08/11/18   1222   WBC  7.2   RBC  4.43*   HEMOGLOBIN  12.4*   HEMATOCRIT  36.7*   MCV  82.8   MCH  28.0   MCHC  33.8   RDW  44.1   PLATELETCT  208   MPV  9.8     Recent Labs      08/11/18   1222   SODIUM  136   POTASSIUM  4.0   CHLORIDE  106   CO2  18*   GLUCOSE  170*   BUN  24*   CREATININE  1.39   CALCIUM  8.9     Recent Labs      08/11/18   1222   ALTSGPT  11   ASTSGOT  12   ALKPHOSPHAT  89   TBILIRUBIN  0.4   GLUCOSE  170*     Recent Labs      08/11/18   1222   APTT  32.0   INR  1.24*     Recent Labs      08/11/18   1222   BNPBTYPENAT  65         Lab Results   Component Value Date    TROPONINI <0.01 08/11/2018       Imaging  CT-HEAD W/O   Final Result         NO ACUTE ABNORMALITIES ARE NOTED ON CT SCAN OF THE HEAD.      Findings are consistent with atrophy.  Decreased attenuation in the periventricular white matter likely indicates microvascular ischemic disease.      DX-CHEST-LIMITED (1 VIEW)   Final Result         No acute cardiac or pulmonary abnormality is identified. Cardiomegaly is noted.      Echocardiogram Comp W/O Cont    (Results Pending)   Carotid Duplex ([facility] [place] [facility] Only)    (Results Pending)   NM-CARDIAC STRESS TEST    (Results Pending)     EKG  per my independant read:  QTc: 482, HR: 72, paced rhythm     Assessment/Plan     I anticipate this  patient is appropriate for observation status at this time.    Paroxysmal A-fib (HCC)   Assessment & Plan    History of pacemaker  On carvedilol, eliquis        Acute kidney injury (HCC)- (present on admission)   Assessment & Plan    Likely prerenal  On IV fluid  Follow CMP        Insulin dependent diabetes mellitus (HCC)- (present on admission)   Assessment & Plan    Continue insulin        Pain in the chest- (present on admission)   Assessment & Plan    With history of coronary artery disease post CABG, 8 stents with last stent placed in June 2018  Continue to trend troponin EKG  Consulted cardiology  Not on asa, statin, bblocker as outpatient  [facility]  Started on above meds  We will leave it to cardiology for further tests              Prophylaxis:  sc heparin

## 2018-08-12 NOTE — PROGRESS NOTES
Cardiology Progress Note               Author: Anish Bray Date & Time created: 2018  9:21 AM       Consultation for syncope and chest pain        Ad[place] and then when he tried to sit down he passed out        History of CAD s/p CABG  single-vessel bypass in  then CABG ×3v in , multiple PCI's, drug-eluting stent to the LAD, Medtronic pacemaker, chronic A. Fib, on eliquis, autonomic dysfunction related to diabetes    /88  HR 70-90's atrial fib/paced    Labs reviewed  Na, K, Cr stable  Trop neg      Review of Systems   Respiratory: Negative for cough and shortness of breath.    Cardiovascular: Negative for chest pain, palpitations, orthopnea, claudication, leg swelling and PND.       Physical Exam   Constitutional: He is oriented to person, place, and time.   HENT:   Head: Normocephalic.   Eyes: Conjunctivae are normal.   Neck: No JVD present. No thyromegaly present.   Cardiovascular:   Pulses:       Carotid pulses are 2+ on the right side, and 2+ on the left side.       Radial pulses are 2+ on the right side, and 2+ on the left side.   paced   Pulmonary/Chest: He has no wheezes.   Abdominal: Soft. He exhibits no distension.   Neurological: He is alert and oriented to person, place, and time.   Skin: Skin is warm and dry.       Hemodynamics:  Temp (24hrs), Av.4 °C (97.6 °F), Min:36.2 °C (97.2 °F), Max:36.8 °C (98.2 °F)  Temperature: 36.8 °C (98.2 °F)  Pulse  Av.4  Min: 71  Max: 97Heart Rate (Monitored): 75  Blood Pressure : 133/88, NIBP: 160/91     Respiratory:    Respiration: 20, Pulse Oximetry: 99 %        RUL Breath Sounds: Clear, RML Breath Sounds: Clear, RLL Breath Sounds: Clear, ZEINAB Breath Sounds: Clear, LLL Breath Sounds: Clear  Fluids:     Weight: 99.8 kg (220 lb 0.3 oz)  GI/Nutrition:  Orders Placed This Encounter   Procedures   • DIET NPO     Standing Status:   Standing     Number of Occurrences:   9     Order Specific Question:    Restrict to:     Answer:   Sips with Medications [3]     Lab Results:  Recent Labs      08/11/18   1222  08/12/18   0226   WBC  7.2  7.8   RBC  4.43*  4.40*   HEMOGLOBIN  12.4*  12.3*   HEMATOCRIT  36.7*  36.0*   MCV  82.8  81.8   MCH  28.0  28.0   MCHC  33.8  34.2   RDW  44.1  41.7   PLATELETCT  208  183   MPV  9.8  9.7     Recent Labs      08/11/18   1222  08/12/18   0226   SODIUM  136  136   POTASSIUM  4.0  4.3   CHLORIDE  106  108   CO2  18*  22   GLUCOSE  170*  136*   BUN  24*  19   CREATININE  1.39  1.25   CALCIUM  8.9  8.8     Recent Labs      08/11/18   1222   APTT  32.0   INR  1.24*     Recent Labs      08/11/18   1222   BNPBTYPENAT  65     Recent Labs      08/11/18   1222  08/11/18   2039  08/11/18   2200   TROPONINI  <0.01  <0.01  <0.01   BNPBTYPENAT  65   --    --              Medical Decision Making, by Problem:  Active Hospital Problems    Diagnosis   • Pain in the chest [R07.9]   • Insulin dependent diabetes mellitus (HCC) [E11.9, Z79.4]   • Acute kidney injury (HCC) [N17.9]   • Paroxysmal A-fib (HCC) [I48.0]       Assessment/Plan:    Syncope  - Device was interrogated overnight, no ventricular  activities, patient is in chronic atrial fib, on Apixaban  - +orthostatic  -Stop Coreg  -Plan for MPI today  -Plan to follow-up on TTE    Currently on aspirin 81, Lipitor 10, hydralazine 25 mg TID      Quality-Core Measures   Reviewed items::  Medications reviewed and Labs reviewed

## 2018-08-12 NOTE — ASSESSMENT & PLAN NOTE
With history of coronary artery disease post CABG, 8 stents with last stent placed in June 2018  Continue to trend troponin EKG  Consulted cardiology  Not on asa, statin, bblocker as outpatient  [facility]  Started on above meds  We will leave it to cardiology for further tests

## 2018-08-12 NOTE — CARE PLAN
Problem: Safety  Goal: Will remain free from falls  Outcome: PROGRESSING AS EXPECTED  Bed locked and in lowest position, call light and personal belongings in reach. Non skid socks on. Purposeful rounding. Up with stand by assistance when ambulating. Bed alarm on. Patient demonstrates proper use of call light.        Problem: Venous Thromboembolism (VTW)/Deep Vein Thrombosis (DVT) Prevention:  Goal: Patient will participate in Venous Thrombosis (VTE)/Deep Vein Thrombosis (DVT)Prevention Measures  Outcome: PROGRESSING AS EXPECTED  Patient on eliquis for VTE prophylactic. Refusing SCD's but education provided on the importance of wearing them in order to prevent blood clots, patient verbalized understanding and stated he will ambulate frequently.

## 2018-08-12 NOTE — ASSESSMENT & PLAN NOTE
Patient still showing signs of positive orthostatic hypotension  Patient simply possibly due to above reason also need to rule out other cardiac genic etiology  Pending echocardiogram and MPI, discontinue Coreg, pacer interrogation showing no significant signs of arrhythmia  Appreciate cardiology's follow-up and recommendation

## 2018-08-12 NOTE — PROGRESS NOTES
2 RN skin check completed with OHRACIO Salguero.  Healed over Scab noted to left heel. Bilateral heels, elbows and ears are pink but blanching.  Sacrum is pink and blanching. Skin CD&I, no wounds noted.

## 2018-08-12 NOTE — PROGRESS NOTES
[facility] Hospitalist Progress Note    Date of Service: 2018    Chief Complaint  68 y.o. male admitted 2018 with loss of consciousness and syncope event patient also complained of chest pain.  Patient was then seen by cardiology.  Patient was noticed to have orthostatic hypotension.  Patient's Coreg was stopped.  Patient also received echocardiogram and MPI.    Interval Problem Update   this morning patient still have hypo-tension due to orthostatic.  Patient has been resolved.  Troponin III times negative.  Pending MPI and echocardiogram.  Discontinue Coreg and continue IV fluids. Patient's pain is local, 4-5/10, intermittent and does not radiate to other location, sharp and with some tingling. Can be controlled by pain meds. .     Consultants/Specialty  card    Disposition  TBD        Review of Systems   Constitutional: Negative for chills, fever and weight loss.   HENT: Negative for congestion, ear discharge, ear pain, hearing loss and nosebleeds.    Eyes: Negative for blurred vision and pain.   Respiratory: Negative for cough, sputum production, shortness of breath and wheezing.    Cardiovascular: Positive for chest pain. Negative for palpitations, leg swelling and PND.   Gastrointestinal: Negative for abdominal pain, constipation, diarrhea, heartburn, nausea and vomiting.   Genitourinary: Negative for dysuria, frequency and hematuria.   Musculoskeletal: Negative for back pain, falls, joint pain and neck pain.   Skin: Negative for rash.   Neurological: Positive for dizziness and loss of consciousness. Negative for tremors, speech change, seizures, weakness and headaches.   Psychiatric/Behavioral: Negative for depression, substance abuse and suicidal ideas.      Physical Exam  Laboratory/Imaging   Hemodynamics  Temp (24hrs), Av.4 °C (97.6 °F), Min:36.2 °C (97.2 °F), Max:36.8 °C (98.2 °F)   Temperature: 36.8 °C (98.2 °F)  Pulse  Av.4  Min: 71  Max: 97 Heart Rate (Monitored): 75  Blood Pressure  : 133/88, NIBP: 160/91      Respiratory      Respiration: 20, Pulse Oximetry: 99 %        RUL Breath Sounds: Clear, RML Breath Sounds: Clear, RLL Breath Sounds: Clear, ZEINAB Breath Sounds: Clear, LLL Breath Sounds: Clear    Fluids    Intake/Output Summary (Last 24 hours) at 08/12/18 0824  Last data filed at 08/12/18 0642   Gross per 24 hour   Intake             1147 ml   Output              620 ml   Net              527 ml       Nutrition  Orders Placed This Encounter   Procedures   • DIET NPO     Standing Status:   Standing     Number of Occurrences:   9     Order Specific Question:   Restrict to:     Answer:   Sips with Medications [3]     Physical Exam   Constitutional: He is oriented to person, place, and time. He appears well-developed and well-nourished.   HENT:   Head: Normocephalic.   Eyes: Pupils are equal, round, and reactive to light. EOM are normal.   Neck: Normal range of motion. Neck supple. No JVD present. No thyromegaly present.   Cardiovascular: Normal rate, regular rhythm and normal heart sounds.  Exam reveals no gallop and no friction rub.    No murmur heard.  Pulmonary/Chest: Effort normal and breath sounds normal. No respiratory distress. He has no wheezes. He has no rales. He exhibits no tenderness.   Abdominal: Soft. Bowel sounds are normal. He exhibits no distension and no mass. There is no tenderness. There is no rebound and no guarding.   Musculoskeletal: Normal range of motion. He exhibits no edema.   Lymphadenopathy:     He has no cervical adenopathy.   Neurological: He is alert and oriented to person, place, and time. He displays normal reflexes. No cranial nerve deficit.   Skin: Skin is dry. No rash noted.   Psychiatric: He has a normal mood and affect.   Nursing note and vitals reviewed.      Recent Labs      08/11/18   1222  08/12/18   0226   WBC  7.2  7.8   RBC  4.43*  4.40*   HEMOGLOBIN  12.4*  12.3*   HEMATOCRIT  36.7*  36.0*   MCV  82.8  81.8   MCH  28.0  28.0   MCHC  33.8  34.2    RDW  44.1  41.7   PLATELETCT  208  183   MPV  9.8  9.7     Recent Labs      08/11/18   1222  08/12/18   0226   SODIUM  136  136   POTASSIUM  4.0  4.3   CHLORIDE  106  108   CO2  18*  22   GLUCOSE  170*  136*   BUN  24*  19   CREATININE  1.39  1.25   CALCIUM  8.9  8.8     Recent Labs      08/11/18   1222   APTT  32.0   INR  1.24*     Recent Labs      08/11/18   1222   BNPBTYPENAT  65              Assessment/Plan     * Syncope due to orthostatic hypotension- (present on admission)   Assessment & Plan    Patient still showing signs of positive orthostatic hypotension  Patient simply possibly due to above reason also need to rule out other cardiac genic etiology  Pending echocardiogram and MPI, discontinue Coreg, pacer interrogation showing no significant signs of arrhythmia  Appreciate cardiology's follow-up and recommendation        Paroxysmal A-fib (HCC)- (present on admission)   Assessment & Plan    History of pacemaker  On carvedilol, eliquis        Acute kidney injury (HCC)- (present on admission)   Assessment & Plan    Likely prerenal  On IV fluid  Follow CMP        Insulin dependent diabetes mellitus (HCC)- (present on admission)   Assessment & Plan    Type 2 diabetes, long-term use of insulin, without complication, uncontrolled with hyperglycemia   continue insulin sliding scale to cover.        Pain in the chest- (present on admission)   Assessment & Plan    With history of coronary artery disease post CABG, 8 stents with last stent placed in June 2018  Continue to trend troponin EKG  Consulted cardiology  Not on asa, statin, bblocker as outpatient  [facility]  Started on above meds  We will leave it to cardiology for further tests            Quality-Core Measures   Reviewed items::  Labs reviewed, Medications reviewed and Radiology images reviewed  Mazariegos catheter::  No Mazariegos  DVT prophylaxis - mechanical:  SCDs  Assessed for rehabilitation services:  Patient was assess for and/or received  rehabilitation services during this hospitalization  Eliquis    For complexity-based billing, please refer to the history, exam, and decison making above. In addition, I spent >35 minutes caring for the patient today. More than 50% of the time was spent counseling and coordinating care.    I have discussed with RN and ODIN and SW and other consultants about patient's plan.

## 2018-08-12 NOTE — PROGRESS NOTES
Paged Dr. Valdivia in regards to if patient is to receive dose of aspirin that was not administered in the ED. Received call back and Dr. Valdivia stated to give patient aspirin dose tonight. Also informed that patient does not have diet order. Orders for cardiac diet and then NPO at midnight.

## 2018-08-12 NOTE — CONSULTS
DATE OF SERVICE:  08/11/2018    CARDIOLOGY CONSULTATION    REASON FOR CONSULT:  Syncope and chest pain.    REFERRING PHYSICIAN:  Dr. Valdivia    HISTORY OF PRESENT ILLNESS:  This is a 68-year-old white male who lives in   [place] who has gotten his care through the [facility] system in [place].  He has had bypass surgery 2 times.  He had stents multiple times in   the past recently earlier this year at [facility].  [place] and then tried to sit down and passed out.  He has had   history of autonomic dysfunction with recurrent [facility].  He has also had some chest discomfort, more recently   described as chest pressure.    PAST MEDICAL HISTORY:  Includes CAD, COPD, history of congestive heart   failure, history of diabetes, history of diabetic autonomic dysfunction,   history of stents, history of hypertension.  He has a permanent pacemaker made   by [employer], history of chronic atrial fibrillation.    PAST SURGICAL HISTORY:  Includes the bypass surgery x2 with single-vessel   bypass in 2009 and 3-vessel bypass in 2013, multiple PCIs with a drug-eluting   stent to the LAD in the past, history of permanent pacemaker made by   [employer], again the chronic atrial fibrillation.    ALLERGIES:  INCLUDE LISINOPRIL.    SOCIAL HISTORY:  He does not smoke.  He does drink some alcohol.    REVIEW OF SYSTEMS:  CONSTITUTIONAL:  Negative.  ENT:  Negative.  EYES:  Negative.  RESPIRATORY:  Negative.  CARDIOVASCULAR:  As noted above syncope and chest pain.  GASTROINTESTINAL:  Negative for abdominal pain.  Rest of review systems negative by the AMA/CMS criteria.    FAMILY HISTORY:  Negative for heart disease.    MEDICATIONS:  His outpatient include the following:  He is on Eliquis,   clonazepam, Lasix, hydralazine, insulin, and vitamin D.    PHYSICAL EXAMINATION:  GENERAL:  A white male, in no acute distress.  VITAL SIGNS:  Blood pressure is  147/92, pulse 84, respirations 16, pulse is   irregular.  HEENT:  JVD 7-8 cm.  Carotids are 2+ without bruits.  LUNGS:  Fairly clear.  CARDIAC:  Sounds regular rate and rhythm, normal S1, S2, without murmurs,   rubs, or gallops.  ABDOMEN:  Soft, nontender without hepatosplenomegaly.  EXTREMITIES:  Without clubbing, cyanosis, or edema.  NEUROLOGIC:  Alert and oriented x3.  Mood and affect appropriate.  SKIN:  Turgor is normal.  BACK:  No evidence of kyphosis.    LABORATORY DATA:  Studies include the following:  The patient has a white   count of 7.2, hemoglobin 12.4, hematocrit 36.7, platelet count of 208,000.    Chemistries:  BUN 24, creatinine 1.39, troponin 0.01.  BNP is 65.  INR is   1.24.  CAT scan was had without contrast, no acute abnormalities.  Chest   x-ray, no acute abnormalities.  EKG shows atrial fibrillation with occasional   ventricular based paced beats.  Nonspecific ST-T wave changes.    ASSESSMENT AND PLAN:  1.  Syncope with history of autonomic dysfunction related to diabetes.  Most   likely this is related to blood pressure issues.  The patient may require   Florinef or midodrine in the future if this continues to be a problem.  2.  Chest discomfort.  Check serial troponins and electrocardiograms.  3.  Check echocardiogram.  4.  Diabetes.  5.  Permanent pacemaker will be interrogated made by [employer].  6.  Chronic atrial fibrillation.  Continue on Eliquis.       ____________________________________     MD JEANNIE MAN / NTS    DD:  08/11/2018 20:02:35  DT:  08/12/2018 00:30:15    D#:  [id number]  Job#:  [id number]

## 2018-08-12 NOTE — PROGRESS NOTES
Patient transported by ACLS RN via gurney. Ambulated from hallway to bed, gait steady. Patient complaining of dull pain in his chest that he states has been the same since he came into the hospital. Plan of care reviewed with patient and all questions answered. Bed locked and in lowest position, call light and personal belongings in reach. Non skid socks on. Purposeful rounding. Bed alarm on.

## 2018-08-12 NOTE — ED NOTES
Pt agitated and wanting to leave AMA, de-escalated pt and encouraged pt to stay.  Daughter called and talked to pt.  ERP in a procedure.

## 2018-08-12 NOTE — ED NOTES
Med rec updated and complete.  Allergies reviewed.  Met with pt at bedside. Pt recently moved to [place]. [facility]. Pt was given discharge prescriptions  But has not used been taking them  Remote history of  [facility] Ap[facility]aban [facility].  Pt denies taking it.  Medications added to med rec.

## 2018-08-13 ENCOUNTER — PATIENT OUTREACH (OUTPATIENT)
Dept: HEALTH INFORMATION MANAGEMENT | Facility: OTHER | Age: 68
End: 2018-08-13

## 2018-08-13 VITALS
HEART RATE: 103 BPM | SYSTOLIC BLOOD PRESSURE: 152 MMHG | HEIGHT: 73 IN | WEIGHT: 218.03 LBS | BODY MASS INDEX: 28.9 KG/M2 | RESPIRATION RATE: 16 BRPM | TEMPERATURE: 97.6 F | OXYGEN SATURATION: 98 % | DIASTOLIC BLOOD PRESSURE: 92 MMHG

## 2018-08-13 LAB
ANION GAP SERPL CALC-SCNC: 6 MMOL/L (ref 0–11.9)
BASOPHILS # BLD AUTO: 1.6 % (ref 0–1.8)
BASOPHILS # BLD: 0.08 K/UL (ref 0–0.12)
BUN SERPL-MCNC: 13 MG/DL (ref 8–22)
CALCIUM SERPL-MCNC: 9 MG/DL (ref 8.5–10.5)
CHLORIDE SERPL-SCNC: 110 MMOL/L (ref 96–112)
CO2 SERPL-SCNC: 21 MMOL/L (ref 20–33)
CREAT SERPL-MCNC: 1.2 MG/DL (ref 0.5–1.4)
EOSINOPHIL # BLD AUTO: 0.21 K/UL (ref 0–0.51)
EOSINOPHIL NFR BLD: 4.1 % (ref 0–6.9)
ERYTHROCYTE [DISTWIDTH] IN BLOOD BY AUTOMATED COUNT: 41.9 FL (ref 35.9–50)
GLUCOSE BLD-MCNC: 172 MG/DL (ref 65–99)
GLUCOSE BLD-MCNC: 99 MG/DL (ref 65–99)
GLUCOSE SERPL-MCNC: 108 MG/DL (ref 65–99)
HCT VFR BLD AUTO: 37 % (ref 42–52)
HGB BLD-MCNC: 12.8 G/DL (ref 14–18)
IMM GRANULOCYTES # BLD AUTO: 0.03 K/UL (ref 0–0.11)
IMM GRANULOCYTES NFR BLD AUTO: 0.6 % (ref 0–0.9)
LYMPHOCYTES # BLD AUTO: 1.26 K/UL (ref 1–4.8)
LYMPHOCYTES NFR BLD: 24.6 % (ref 22–41)
MCH RBC QN AUTO: 27.9 PG (ref 27–33)
MCHC RBC AUTO-ENTMCNC: 34.6 G/DL (ref 33.7–35.3)
MCV RBC AUTO: 80.8 FL (ref 81.4–97.8)
MONOCYTES # BLD AUTO: 0.46 K/UL (ref 0–0.85)
MONOCYTES NFR BLD AUTO: 9 % (ref 0–13.4)
NEUTROPHILS # BLD AUTO: 3.09 K/UL (ref 1.82–7.42)
NEUTROPHILS NFR BLD: 60.1 % (ref 44–72)
NRBC # BLD AUTO: 0 K/UL
NRBC BLD-RTO: 0 /100 WBC
PLATELET # BLD AUTO: 183 K/UL (ref 164–446)
PMV BLD AUTO: 9.7 FL (ref 9–12.9)
POTASSIUM SERPL-SCNC: 3.9 MMOL/L (ref 3.6–5.5)
RBC # BLD AUTO: 4.58 M/UL (ref 4.7–6.1)
SODIUM SERPL-SCNC: 137 MMOL/L (ref 135–145)
WBC # BLD AUTO: 5.1 K/UL (ref 4.8–10.8)

## 2018-08-13 PROCEDURE — G0378 HOSPITAL OBSERVATION PER HR: HCPCS

## 2018-08-13 PROCEDURE — 700105 HCHG RX REV CODE 258: Performed by: INTERNAL MEDICINE

## 2018-08-13 PROCEDURE — 80048 BASIC METABOLIC PNL TOTAL CA: CPT

## 2018-08-13 PROCEDURE — A9270 NON-COVERED ITEM OR SERVICE: HCPCS | Performed by: INTERNAL MEDICINE

## 2018-08-13 PROCEDURE — 700102 HCHG RX REV CODE 250 W/ 637 OVERRIDE(OP): Performed by: INTERNAL MEDICINE

## 2018-08-13 PROCEDURE — 96372 THER/PROPH/DIAG INJ SC/IM: CPT

## 2018-08-13 PROCEDURE — 99217 PR OBSERVATION CARE DISCHARGE: CPT | Performed by: INTERNAL MEDICINE

## 2018-08-13 PROCEDURE — 36415 COLL VENOUS BLD VENIPUNCTURE: CPT

## 2018-08-13 PROCEDURE — 82962 GLUCOSE BLOOD TEST: CPT | Mod: 91

## 2018-08-13 PROCEDURE — 85025 COMPLETE CBC W/AUTO DIFF WBC: CPT

## 2018-08-13 RX ORDER — AMLODIPINE BESYLATE 5 MG/1
5 TABLET ORAL
Status: DISCONTINUED | OUTPATIENT
Start: 2018-08-13 | End: 2018-08-13 | Stop reason: HOSPADM

## 2018-08-13 RX ORDER — AMLODIPINE BESYLATE 5 MG/1
5 TABLET ORAL DAILY
Qty: 30 TAB | Refills: 1 | Status: SHIPPED | OUTPATIENT
Start: 2018-08-14

## 2018-08-13 RX ADMIN — INSULIN HUMAN 1 UNITS: 100 INJECTION, SOLUTION PARENTERAL at 12:26

## 2018-08-13 RX ADMIN — SODIUM CHLORIDE: 9 INJECTION, SOLUTION INTRAVENOUS at 00:26

## 2018-08-13 RX ADMIN — ASPIRIN 81 MG: 81 TABLET, CHEWABLE ORAL at 05:57

## 2018-08-13 RX ADMIN — HYDRALAZINE HYDROCHLORIDE 25 MG: 25 TABLET, FILM COATED ORAL at 05:56

## 2018-08-13 RX ADMIN — APIXABAN 5 MG: 5 TABLET, FILM COATED ORAL at 05:56

## 2018-08-13 RX ADMIN — AMLODIPINE BESYLATE 5 MG: 5 TABLET ORAL at 09:22

## 2018-08-13 ASSESSMENT — PAIN SCALES - GENERAL
PAINLEVEL_OUTOF10: 0
PAINLEVEL_OUTOF10: 0

## 2018-08-13 NOTE — DISCHARGE SUMMARY
Discharge Summary    CHIEF COMPLAINT ON ADMISSION  Chief Complaint   Patient presents with   • Syncope     Pt had witnessed syncopal event while at [facility].  Pt had 2 gins this morning.  Pt has history CABG in Oct 2013 and recent MI in June.  No trauma noted.         Reason for Admission  Syncope    Admission Date  8/11/2018    CODE STATUS  Full Code    HPI & HOSPITAL COURSE  This is a 68 y.o. male here with complaint of loss of consciousness and patient was noticed to have syncope.  Apparently patient also has long history of hypertension and diabetes.  Cardiologist has been consulted and patient was evaluated.  Patient does have positive orthostatic hypotension.  This patient's beta-blocker and hydralazine has been stopped per cardiology recommendation.  Patient antihypertensive medication has been adjusted and amlodipine has been added.  Patient currently symptom-free.  [facility] and [facility].  Is also educated to be careful about autonomy dysfunction symptoms as documented above    Patient's echocardiogram showing normal ejection fraction, patient's stress test also did not show significant signs of ischemia.       Therefore, he is discharged in fair and stable condition to home with close outpatient follow-up.    The patient met 2-midnight criteria for an inpatient stay at the time of discharge.    Discharge Date  8/13/2018    FOLLOW UP ITEMS POST DISCHARGE  none    DISCHARGE DIAGNOSES      Syncope due to orthostatic hypotension POA: Yes    Pain in the chest POA: Yes    Insulin dependent diabetes mellitus (HCC) POA: Yes    Acute kidney injury (HCC) POA: Yes    Paroxysmal A-fib (HCC) POA: Yes      FOLLOW UP  No future appointments.  [facility]      please call your pcp once you return home to [place]. Thank you      MEDICATIONS ON DISCHARGE     Medication List      START taking these medications      Instructions   amLODIPine 5 MG Tabs  Commonly known  as:  NORVASC   Take 1 Tab by mouth every day.  Dose:  5 mg        CONTINUE taking these medications      Instructions   apixaban 5mg Tabs  Commonly known as:  ELIQUIS   Take 5 mg by mouth 2 Times a Day.  Dose:  5 mg     clonazePAM 0.5 MG Tabs  Commonly known as:  KLONOPIN   Take 0.25 mg by mouth at bedtime as needed.  Dose:  0.25 mg     insulin aspart 100 UNIT/ML Soln  Commonly known as:  NOVOLOG   Inject 4 Units as instructed 3 times a day before meals.  Dose:  4 Units     insulin glargine 100 UNIT/ML Soln  Commonly known as:  LANTUS   Inject 24 Units as instructed every evening.  Dose:  24 Units     vitamin D 1000 UNIT Tabs  Commonly known as:  cholecalciferol   Take 1,000 Units by mouth every day.  Dose:  1000 Units        STOP taking these medications    furosemide 40 MG Tabs  Commonly known as:  LASIX     hydrALAZINE 25 MG Tabs  Commonly known as:  APRESOLINE            Allergies  Allergies   Allergen Reactions   • Codeine Rash     Rash on arm      • Lisinopril        DIET  Orders Placed This Encounter   Procedures   • Diet Order Cardiac, Diabetic     Standing Status:   Standing     Number of Occurrences:   1     Order Specific Question:   Diet:     Answer:   Cardiac [6]     Order Specific Question:   Diet:     Answer:   Diabetic [3]       ACTIVITY  As tolerated.  Weight bearing as tolerated    CONSULTATIONS  cards    PROCEDURES  None    Carotid Duplex ([facility] Only)   Final Result      Echocardiogram Comp W/O Cont   Final Result   No prior study is available for comparison.   Mild concentric left ventricular hypertrophy.  Normal left ventricular systolic function.  Left ventricular ejection fraction is visually estimated to be 55%.  Diastolic function is difficult to assess with atrial fibrillation.  Normal inferior vena cava size and inspiratory collapse.     NM-CARDIAC STRESS TEST   Final Result    NUCLEAR IMAGING INTERPRETATION   No reversible defects that would indicate ischemia.     Non-reversible defect noted.    The ejection fraction post stress is 55%.      CT-HEAD W/O   Final Result         NO ACUTE ABNORMALITIES ARE NOTED ON CT SCAN OF THE HEAD.      Findings are consistent with atrophy.  Decreased attenuation in the periventricular white matter likely indicates microvascular ischemic disease.      DX-CHEST-LIMITED (1 VIEW)   Final Result         No acute cardiac or pulmonary abnormality is identified. Cardiomegaly is noted.        PE:  Gen: AAOx3, NAD  Eyes: PELLA  Neck: no JVD, no lymphadenopathy  Cardia: RRR, no mrg  Lungs: CTAB, no rales, rhonci or wheezing  Abd: NABS, soft, non extended, no mass  EXT: No C/C/E, peripheral pulse 2+ b/l  Neuro: CN II-XII intact, non focal, reflex 2+ symmetrical  Skin: Intact, no lesion, warm  Psych: Appropriate.        LABORATORY  Lab Results   Component Value Date    SODIUM 137 08/13/2018    POTASSIUM 3.9 08/13/2018    CHLORIDE 110 08/13/2018    CO2 21 08/13/2018    GLUCOSE 108 (H) 08/13/2018    BUN 13 08/13/2018    CREATININE 1.20 08/13/2018        Lab Results   Component Value Date    WBC 5.1 08/13/2018    HEMOGLOBIN 12.8 (L) 08/13/2018    HEMATOCRIT 37.0 (L) 08/13/2018    PLATELETCT 183 08/13/2018        Total time of the discharge process exceeds 37 minutes.

## 2018-08-13 NOTE — CARE PLAN
Problem: Knowledge Deficit  Goal: Knowledge of disease process/condition, treatment plan, diagnostic tests, and medications will improve    Intervention: Assess knowledge level of disease process/condition, treatment plan, diagnostic tests, and medications  Teaching on new medication amlodipine, discuss hospitalization and requirements for discharge with patient and family.       Problem: Discharge Barriers/Planning  Goal: Patient's continuum of care needs will be met    Intervention: Assess potential discharge barriers on admission and throughout hospital stay  Discuss discharge orders and new medications, organize plan for checking blood pressure at home.

## 2018-08-13 NOTE — PROGRESS NOTES
Assumed care of patient, A+Ox4 no c/o pain at this time. Skin intact, lungs clear on RA. Ambulates to bathroom self, tolerates well. Showing a-fib on telemetry monitor.

## 2018-08-13 NOTE — DISCHARGE INSTRUCTIONS
Discharge Instructions    Discharged to home by car with relative. Discharged via wheelchair, hospital escort: Yes.  Special equipment needed: Not Applicable    Be sure to schedule a follow-up appointment with your primary care doctor or any specialists as instructed.     Discharge Plan:   Diet Plan: Discussed  Activity Level: Discussed  Confirmed Follow up Appointment: Appointment Scheduled  Confirmed Symptoms Management: Discussed  Medication Reconciliation Updated: Yes  Influenza Vaccine Indication: Patient Refuses    I understand that a diet low in cholesterol, fat, and sodium is recommended for good health. Unless I have been given specific instructions below for another diet, I accept this instruction as my diet prescription.   Other diet: cardiac, diabetic diet    Special Instructions: None    · Is patient discharged on Warfarin / Coumadin?   No     Depression / Suicide Risk    As you are discharged from this [facility] facility, it is important to learn how to keep safe from harming yourself.    Recognize the warning signs:  · Abrupt changes in personality, positive or negative- including increase in energy   · Giving away possessions  · Change in eating patterns- significant weight changes-  positive or negative  · Change in sleeping patterns- unable to sleep or sleeping all the time   · Unwillingness or inability to communicate  · Depression  · Unusual sadness, discouragement and loneliness  · Talk of wanting to die  · Neglect of personal appearance   · Rebelliousness- reckless behavior  · Withdrawal from people/activities they love  · Confusion- inability to concentrate     If you or a loved one observes any of these behaviors or has concerns about self-harm, here's what you can do:  · Talk about it- your feelings and reasons for harming yourself  · Remove any means that you might use to hurt yourself (examples: pills, rope, extension cords, firearm)  · Get professional help from the community (Mental  Health, Substance Abuse, psychological counseling)  · Do not be alone:Call your Safe Contact- someone whom you trust who will be there for you.  · Call your local CRISIS HOTLINE 262-0693 or 787-211-7331  · Call your local Children's Mobile Crisis Response Team [place] (324) 579-6491 or www.[employer]  · Call the toll free National Suicide Prevention Hotlines   · National Suicide Prevention Lifeline 200-328-XUZG (2231)  · [facility] [facility] 800-SUICIDE (508-3379)      Hypertension  Hypertension is another name for high blood pressure. High blood pressure forces your heart to work harder to pump blood. A blood pressure reading has two numbers, which includes a higher number over a lower number (example: 110/72).  Follow these instructions at home:  · Have your blood pressure rechecked by your doctor.  · Only take medicine as told by your doctor. Follow the directions carefully. The medicine does not work as well if you skip doses. Skipping doses also puts you at risk for problems.  · Do not smoke.  · Monitor your blood pressure at home as told by your doctor.  Contact a doctor if:  · You think you are having a reaction to the medicine you are taking.  · You have repeat headaches or feel dizzy.  · You have puffiness (swelling) in your ankles.  · You have trouble with your vision.  Get help right away if:  · You get a very bad headache and are confused.  · You feel weak, numb, or faint.  · You get chest or belly (abdominal) pain.  · You throw up (vomit).  · You cannot breathe very well.  This information is not intended to replace advice given to you by your health care provider. Make sure you discuss any questions you have with your health care provider.  Document Released: 06/05/2009 Document Revised: 05/25/2017 Document Reviewed: 10/10/2014  Elsevier Interactive Patient Education © 2017 Elsevier Inc.      Orthostatic Hypotension  Orthostatic hypotension is a sudden drop in blood pressure that  happens when you quickly change positions, such as when you get up from a seated or lying position. Blood pressure is a measurement of how strongly, or weakly, your blood is pressing against the walls of your arteries. Arteries are blood vessels that carry blood from your heart throughout your body. When blood pressure is too low, you may not get enough blood to your brain or to the rest of your organs. This can cause weakness, light-headedness, rapid heartbeat, and fainting. This can last for just a few seconds or for up to a few minutes.  Orthostatic hypotension is usually not a serious problem. However, if it happens frequently or gets worse, it may be a sign of something more serious.  What are the causes?  This condition may be caused by:  · Sudden changes in posture, such as standing up quickly after you have been sitting or lying down.  · Blood loss.  · Loss of body fluids (dehydration).  · Heart problems.  · Hormone (endocrine) problems.  · Pregnancy.  · Severe infection.  · Lack of certain nutrients.  · Severe allergic reactions (anaphylaxis).  · Certain medicines, such as blood pressure medicine or medicines that make the body lose excess fluids (diuretics). Sometimes, this condition can be caused by not taking medicine as directed, such as taking too much of a certain medicine.  What increases the risk?  Certain factors can make you more likely to develop orthostatic hypotension, including:  · Age. Risk increases as you get older.  · Conditions that affect the heart or the central nervous system.  · Taking certain medicines, such as blood pressure medicine or diuretics.  · Being pregnant.  What are the signs or symptoms?  Symptoms of this condition may include:  · Weakness.  · Light-headedness.  · Dizziness.  · Blurred vision.  · Fatigue.  · Rapid heartbeat.  · Fainting, in severe cases.  How is this diagnosed?  This condition is diagnosed based on:  · Your medical history.  · Your symptoms.  · Your blood  "pressure measurement. Your health care provider will check your blood pressure when you are:  ¨ Lying down.  ¨ Sitting.  ¨ Standing.  A blood pressure reading is recorded as two numbers, such as \"120 over 80\" (or 120/80). The first (\"top\") number is called the systolic pressure. It is a measure of the pressure in your arteries as your heart beats. The second (\"bottom\") number is called the diastolic pressure. It is a measure of the pressure in your arteries when your heart relaxes between beats. Blood pressure is measured in a unit called mm Hg. Healthy blood pressure for adults is 120/80. If your blood pressure is below 90/60, you may be diagnosed with hypotension.  Other information or tests that may be used to diagnose orthostatic hypotension include:  · Your other vital signs, such as your heart rate and temperature.  · Blood tests.  · Tilt table test. For this test, you will be safely secured to a table that moves you from a lying position to an upright position. Your heart rhythm and blood pressure will be monitored during the test.  How is this treated?  Treatment for this condition may include:  · Changing your diet. This may involve eating more salt (sodium) or drinking more water.  · Taking medicines to raise your blood pressure.  · Changing the dosage of certain medicines you are taking that might be lowering your blood pressure.  · Wearing compression stockings. These stockings help to prevent blood clots and reduce swelling in your legs.  In some cases, you may need to go to the hospital for:  · Fluid replacement. This means you will receive fluids through an IV tube.  · Blood replacement. This means you will receive donated blood through an IV tube (transfusion).  · Treating an infection or heart problems, if this applies.  · Monitoring. You may need to be monitored while medicines that you are taking wear off.  Follow these instructions at home:  Eating and drinking  · Drink enough fluid to keep your " urine clear or pale yellow.  · Eat a healthy diet and follow instructions from your health care provider about eating or drinking restrictions. A healthy diet includes:  ¨ Fresh fruits and vegetables.  ¨ Whole grains.  ¨ Lean meats.  ¨ Low-fat dairy products.  · Eat extra salt only as directed. Do not add extra salt to your diet unless your health care provider told you to do that.  · Eat frequent, small meals.  · Avoid standing up suddenly after eating.  Medicines  · Take over-the-counter and prescription medicines only as told by your health care provider.  ¨ Follow instructions from your health care provider about changing the dosage of your current medicines, if this applies.  ¨ Do not stop or adjust any of your medicines on your own.  General instructions  · Wear compression stockings as told by your health care provider.  · Get up slowly from lying down or sitting positions. This gives your blood pressure a chance to adjust.  · Avoid hot showers and excessive heat as directed by your health care provider.  · Return to your normal activities as told by your health care provider. Ask your health care provider what activities are safe for you.  · Do not use any products that contain nicotine or tobacco, such as cigarettes and e-cigarettes. If you need help quitting, ask your health care provider.  · Keep all follow-up visits as told by your health care provider. This is important.  Contact a health care provider if:  · You vomit.  · You have diarrhea.  · You have a fever for more than 2-3 days.  · You feel more thirsty than usual.  · You feel weak and tired.  Get help right away if:  · You have chest pain.  · You have a fast or irregular heartbeat.  · You develop numbness in any part of your body.  · You cannot move your arms or your legs.  · You have trouble speaking.  · You become sweaty or feel lightheaded.  · You faint.  · You feel short of breath.  · You have trouble staying awake.  · You feel  confused.  This information is not intended to replace advice given to you by your health care provider. Make sure you discuss any questions you have with your health care provider.  Document Released: 12/08/2003 Document Revised: 09/05/2017 Document Reviewed: 06/09/2017  [employer] Interactive Patient Education © 2017 [employer]      Syncope  Introduction  Syncope is when you lose temporarily pass out (faint). Signs that you may be about to pass out include:  · Feeling dizzy or light-headed.  · Feeling sick to your stomach (nauseous).  · Seeing all white or all black.  · Having cold, clammy skin.  If you passed out, get help right away. Call your local emergency services (911 in the [place] Do not drive yourself to the hospital.  Follow these instructions at home:  Pay attention to any changes in your symptoms. Take these actions to help with your condition:  · Have someone stay with you until you feel stable.  · Do not drive, use machinery, or play sports until your doctor says it is okay.  · Keep all follow-up visits as told by your doctor. This is important.  · If you start to feel like you might pass out, lie down right away and raise (elevate) your feet above the level of your heart. Breathe deeply and steadily. Wait until all of the symptoms are gone.  · Drink enough fluid to keep your pee (urine) clear or pale yellow.  · If you are taking blood pressure or heart medicine, get up slowly and spend many minutes getting ready to sit and then stand. This can help with dizziness.  · Take over-the-counter and prescription medicines only as told by your doctor.  Get help right away if:  · You have a very bad headache.  · You have unusual pain in your chest, tummy, or back.  · You are bleeding from your mouth or rectum.  · You have black or tarry poop (stool).  · You have a very fast or uneven heartbeat (palpitations).  · It hurts to breathe.  · You pass out once or more than once.  · You have jerky movements that  you cannot control (seizure).  · You are confused.  · You have trouble walking.  · You are very weak.  · You have vision problems.  These symptoms may be an emergency. Do not wait to see if the symptoms will go away. Get medical help right away. Call your local emergency services (911 in the [place] Do not drive yourself to the hospital.   This information is not intended to replace advice given to you by your health care provider. Make sure you discuss any questions you have with your health care provider.  Document Released: 06/05/2009 Document Revised: 05/25/2017 Document Reviewed: 08/31/2016 © 2017 Elsevier    Amlodipine tablets  What is this medicine?  AMLODIPINE (am CEDRIC di peen) is a calcium-channel blocker. It affects the amount of calcium found in your heart and muscle cells. This relaxes your blood vessels, which can reduce the amount of work the heart has to do. This medicine is used to lower high blood pressure. It is also used to prevent chest pain.  This medicine may be used for other purposes; ask your health care provider or pharmacist if you have questions.  COMMON BRAND NAME(S): Norvasc  What should I tell my health care provider before I take this medicine?  They need to know if you have any of these conditions:  -heart problems like heart failure or aortic stenosis  -liver disease  -an unusual or allergic reaction to amlodipine, other medicines, foods, dyes, or preservatives  -pregnant or trying to get pregnant  -breast-feeding  How should I use this medicine?  Take this medicine by mouth with a glass of water. Follow the directions on the prescription label. Take your medicine at regular intervals. Do not take more medicine than directed.  Talk to your pediatrician regarding the use of this medicine in children. Special care may be needed. This medicine has been used in children as young as 6.  Persons over 65 years old may have a stronger reaction to this medicine and need smaller  doses.  Overdosage: If you think you have taken too much of this medicine contact a poison control center or emergency room at once.  NOTE: This medicine is only for you. Do not share this medicine with others.  What if I miss a dose?  If you miss a dose, take it as soon as you can. If it is almost time for your next dose, take only that dose. Do not take double or extra doses.  What may interact with this medicine?  -herbal or dietary supplements  -local or general anesthetics  -medicines for high blood pressure  -medicines for prostate problems  -rifampin  This list may not describe all possible interactions. Give your health care provider a list of all the medicines, herbs, non-prescription drugs, or dietary supplements you use. Also tell them if you smoke, drink alcohol, or use illegal drugs. Some items may interact with your medicine.  What should I watch for while using this medicine?  Visit your doctor or health care professional for regular check ups. Check your blood pressure and pulse rate regularly. Ask your health care professional what your blood pressure and pulse rate should be, and when you should contact him or her.  This medicine may make you feel confused, dizzy or lightheaded. Do not drive, use machinery, or do anything that needs mental alertness until you know how this medicine affects you. To reduce the risk of dizzy or fainting spells, do not sit or stand up quickly, especially if you are an older patient. Avoid alcoholic drinks; they can make you more dizzy.  Do not suddenly stop taking amlodipine. Ask your doctor or health care professional how you can gradually reduce the dose.  What side effects may I notice from receiving this medicine?  Side effects that you should report to your doctor or health care professional as soon as possible:  -allergic reactions like skin rash, itching or hives, swelling of the face, lips, or tongue  -breathing problems  -changes in vision or hearing  -chest  pain  -fast, irregular heartbeat  -swelling of legs or ankles  Side effects that usually do not require medical attention (report to your doctor or health care professional if they continue or are bothersome):  -dry mouth  -facial flushing  -nausea, vomiting  -stomach gas, pain  -tired, weak  -trouble sleeping  This list may not describe all possible side effects. Call your doctor for medical advice about side effects. You may report side effects to FDA at 3-701-FHC-4024.  Where should I keep my medicine?  Keep out of the reach of children.  Store at room temperature between 59 and 86 degrees F (15 and 30 degrees C). Protect from light. Keep container tightly closed. Throw away any unused medicine after the expiration date.  NOTE: This sheet is a summary. It may not cover all possible information. If you have questions about this medicine, talk to your doctor, pharmacist, or health care provider.  © 2018 Elsevier/Gold Standard (2013-11-15 11:40:58)

## 2018-08-13 NOTE — DISCHARGE PLANNING
Anticipated Discharge Disposition: Home    Action: Assessment completed. Anticipate discharge home with no needs.     Barriers to Discharge: None    Plan: Awaiting medical clearance.

## 2018-08-14 ENCOUNTER — PATIENT OUTREACH (OUTPATIENT)
Dept: HEALTH INFORMATION MANAGEMENT | Facility: OTHER | Age: 68
End: 2018-08-14

## 2018-08-14 NOTE — LETTER
Christo Degroot  [address] [place] [address]    August 14, 2018      Dear Christo Degroot,    [facility] wants to ensure your discharge home is safe and you or your loved ones have had all of your questions answered regarding your care after you leave the hospital.    Our discharge team was unsuccessful in our attempts to contact you telephonically and we wanted to be sure that you had a list of resources and contact information should you have any questions regarding your hospital stay, follow-up instructions, or active medical symptoms.    Questions or Concerns Regarding… Contact   Medical Questions Related to Your Discharge  (7 days a week, 8am-5pm) Contact a Nurse Care Coordinator   [phone]   Medical Questions Not Related to Your Discharge  (24 hours a day / 7 days a week)  Contact the Nurse Health Line   [phone]    Medications or Discharge Instructions Refer to your discharge packet   or contact your -[phone]   Follow-up Appointment(s) Schedule your appointment via [employer]   or contact Scheduling [phone]   Billing Review your statement via [employer]  or contact Billing [phone]   Medical Records Review your records via [employer]   or contact Medical Records [phone]     You can also easily access your medical information, test results and upcoming appointments via the [employer] free online health management tool. You can learn more and sign up at [employer]. For assistance setting up your [employer] account, please call [phone].    Once again, we want to ensure your discharge home is safe and that you have a clear understanding of any next steps in your care. If you have any questions or concerns, please do not hesitate to contact us, we are here for you. Thank you for choosing [facility] for your healthcare needs.    Sincerely,      Your [facility] Healthcare Team

## 2018-08-16 NOTE — ADDENDUM NOTE
Encounter addended by: Nuno Baltazar M.D. on: 8/16/2018 11:33 AM<BR>    Actions taken: Sign clinical note
